# Patient Record
Sex: FEMALE | Race: WHITE | NOT HISPANIC OR LATINO | URBAN - METROPOLITAN AREA
[De-identification: names, ages, dates, MRNs, and addresses within clinical notes are randomized per-mention and may not be internally consistent; named-entity substitution may affect disease eponyms.]

---

## 2023-09-25 ENCOUNTER — CLINICAL SUPPORT (OUTPATIENT)
Dept: PEDIATRICS | Facility: CLINIC | Age: 48
End: 2023-09-25
Payer: COMMERCIAL

## 2023-09-25 DIAGNOSIS — Z23 ENCOUNTER FOR IMMUNIZATION: ICD-10-CM

## 2023-09-25 PROCEDURE — 90471 IMMUNIZATION ADMIN: CPT | Performed by: PEDIATRICS

## 2023-09-25 PROCEDURE — 90686 IIV4 VACC NO PRSV 0.5 ML IM: CPT | Performed by: PEDIATRICS

## 2023-09-25 NOTE — PROGRESS NOTES
Has the patient already received the influenza vaccine this season?  NO    Is the patient LESS than 6 months in age?  NO    Does the patient have an allergy to the influenza vaccine?  NO    Has the patient received a solid organ transplant in the past 3 months?  NO    Has the patient had anaphylaxis to gelatin or eggs?  NO    Does the patient have an allergy to Gentamicin?  NO    Has the patient been diagnosed with Guillain-[place] within 6 weeks after a previous flu vaccine?  NO

## 2024-11-04 ENCOUNTER — APPOINTMENT (OUTPATIENT)
Dept: OBSTETRICS AND GYNECOLOGY | Facility: CLINIC | Age: 49
End: 2024-11-04
Payer: COMMERCIAL

## 2024-11-04 VITALS — SYSTOLIC BLOOD PRESSURE: 114 MMHG | WEIGHT: 216.2 LBS | DIASTOLIC BLOOD PRESSURE: 76 MMHG

## 2024-11-04 DIAGNOSIS — Z12.11 COLON CANCER SCREENING: ICD-10-CM

## 2024-11-04 DIAGNOSIS — N95.1 MENOPAUSAL SYNDROME ON HORMONE REPLACEMENT THERAPY: Primary | ICD-10-CM

## 2024-11-04 DIAGNOSIS — Z79.890 MENOPAUSAL SYNDROME ON HORMONE REPLACEMENT THERAPY: Primary | ICD-10-CM

## 2024-11-04 DIAGNOSIS — Z12.31 BREAST CANCER SCREENING BY MAMMOGRAM: ICD-10-CM

## 2024-11-04 PROCEDURE — 99204 OFFICE O/P NEW MOD 45 MIN: CPT | Performed by: NURSE PRACTITIONER

## 2024-11-04 PROCEDURE — 1036F TOBACCO NON-USER: CPT | Performed by: NURSE PRACTITIONER

## 2024-11-04 RX ORDER — ESTRADIOL 0.5 MG/1
0.5 TABLET ORAL DAILY
Qty: 30 TABLET | Refills: 11 | Status: SHIPPED | OUTPATIENT
Start: 2024-11-04

## 2024-11-04 ASSESSMENT — ENCOUNTER SYMPTOMS
RESPIRATORY NEGATIVE: 0
ENDOCRINE NEGATIVE: 0
MYALGIAS: 0
HEMATOLOGIC/LYMPHATIC NEGATIVE: 0
NERVOUS/ANXIOUS: 1
EYES NEGATIVE: 0
SLEEP DISTURBANCE: 0
DECREASED CONCENTRATION: 1
PSYCHIATRIC NEGATIVE: 0
NEUROLOGICAL NEGATIVE: 0
MUSCULOSKELETAL NEGATIVE: 0
ALLERGIC/IMMUNOLOGIC NEGATIVE: 0
DIFFICULTY URINATING: 0
CONFUSION: 1
GASTROINTESTINAL NEGATIVE: 0
CARDIOVASCULAR NEGATIVE: 0
CONSTITUTIONAL NEGATIVE: 0
ARTHRALGIAS: 0
AGITATION: 0

## 2024-11-04 ASSESSMENT — PAIN SCALES - GENERAL: PAINLEVEL_OUTOF10: 0-NO PAIN

## 2024-11-04 NOTE — PROGRESS NOTES
"Subjective   Patient ID: Serenity Black \"Leigh" is a 49 y.o. female who presents for Menopause.  HPI  Concerns: brown discharge 1-2 days every 3 months for the last year  No HTN, no blood clots, quit smoking 13 years ago    H/O PCOS, gestational DM, migraines, tubo ligation, was previously on BC, takes semaglutide & metformin    Menopausal age: n/a, perimenopausal    contraception:  mirena IUD placed in 2019 for excessive vaginal bleeding; amenorrhea      HT: none  use of OTC remedies: vitamin C, D; multivitamin, mg, cranberry supplement for recurrent UTIs, creatine, collagen  bioidenticals: menopausal supplement (O positive)     Vaginal bleeding: none  VMS: hot flashes previously; does admit she feels hot at night cannot confirm if its a hot flash  Sleep difficulties: no   Mood changes: yes depression & anxiety, experiencing waves of emotions, \"roller coaster\"  Joint pain: no  Brain fog/difficulty concentrating: yes  Hair change: texture, breakage, thinning    GSM: no  are you sexually active: yes, with    dyspareunia: no  decrease in desire: yes  difficulty reaching orgasm: yes  Urinary Incontinence: yes, stress         Fractures: none    FH:   breast cancer: mom diagnosed at 39, passed at 43  ovarian cancer: no  colon cancer: no  pancreatic cancer: no    Social history:   lives with:  & two kids    occupation:    Exercise: yes, lift & cardio  weight bearing: yes    Mammogram in January 2025 (regular mammos since 29), needs colonoscopy wants referral (has had polyps in the past)     Review of Systems   Genitourinary:  Positive for vaginal discharge. Negative for difficulty urinating, dyspareunia, menstrual problem, pelvic pain, vaginal bleeding and vaginal pain.        Positive for stress incontinence.   Musculoskeletal:  Negative for arthralgias and myalgias.   Psychiatric/Behavioral:  Positive for confusion and decreased concentration. Negative for agitation and sleep " disturbance. The patient is nervous/anxious.         Positive for feelings of depression and anxiety which she states is her baseline.        Objective   Physical Exam  Constitutional:       General: She is not in acute distress.     Appearance: Normal appearance. She is normal weight. She is not ill-appearing, toxic-appearing or diaphoretic.   HENT:      Head: Normocephalic and atraumatic.      Nose: Nose normal.   Neurological:      General: No focal deficit present.      Mental Status: She is alert and oriented to person, place, and time.   Psychiatric:         Mood and Affect: Mood normal.         Behavior: Behavior normal.         Assessment/Plan   perimenopause  Patient was prescribed estradiol PO, Mirena IUD for uterine protection. Patient was instructed to follow up in 6 weeks with updates regarding symptom management.   Mammogram was last performed in 5/2023; ordered mammogram to be performed. Will inform patient mammogram was ordered at follow up.  Colonoscopy ordered per pt request       BELA Gonzalez-CNP 11/04/24 8:58 AM

## 2025-01-16 DIAGNOSIS — Z79.890 MENOPAUSAL SYNDROME ON HORMONE REPLACEMENT THERAPY: Primary | ICD-10-CM

## 2025-01-16 DIAGNOSIS — N95.1 MENOPAUSAL SYNDROME ON HORMONE REPLACEMENT THERAPY: Primary | ICD-10-CM

## 2025-01-17 RX ORDER — ESTRADIOL 1 MG/1
1 TABLET ORAL DAILY
Qty: 30 TABLET | Refills: 11 | Status: SHIPPED | OUTPATIENT
Start: 2025-01-17

## 2025-01-17 NOTE — PROGRESS NOTES
She is still having some bothersome menopausal symptoms, estradiol increased from 0.5mg to 1mg; Mirena IUD for uterine protection

## 2025-10-21 ENCOUNTER — APPOINTMENT (OUTPATIENT)
Dept: GASTROENTEROLOGY | Facility: HOSPITAL | Age: 50
End: 2025-10-21
Payer: COMMERCIAL

## 2025-11-10 ENCOUNTER — APPOINTMENT (OUTPATIENT)
Dept: OBSTETRICS AND GYNECOLOGY | Facility: CLINIC | Age: 50
End: 2025-11-10
Payer: COMMERCIAL